# Patient Record
Sex: FEMALE | Race: WHITE
[De-identification: names, ages, dates, MRNs, and addresses within clinical notes are randomized per-mention and may not be internally consistent; named-entity substitution may affect disease eponyms.]

---

## 2017-03-21 ENCOUNTER — HOSPITAL ENCOUNTER (INPATIENT)
Dept: HOSPITAL 26 - MED | Age: 42
LOS: 3 days | Discharge: HOME | DRG: 463 | End: 2017-03-24
Attending: FAMILY MEDICINE | Admitting: FAMILY MEDICINE
Payer: COMMERCIAL

## 2017-03-21 VITALS — HEIGHT: 61 IN | BODY MASS INDEX: 27.38 KG/M2 | WEIGHT: 145 LBS

## 2017-03-21 VITALS — SYSTOLIC BLOOD PRESSURE: 146 MMHG | DIASTOLIC BLOOD PRESSURE: 95 MMHG

## 2017-03-21 DIAGNOSIS — E83.42: ICD-10-CM

## 2017-03-21 DIAGNOSIS — E83.51: ICD-10-CM

## 2017-03-21 DIAGNOSIS — Z90.49: ICD-10-CM

## 2017-03-21 DIAGNOSIS — J45.909: ICD-10-CM

## 2017-03-21 DIAGNOSIS — N12: Primary | ICD-10-CM

## 2017-03-21 DIAGNOSIS — N39.0: ICD-10-CM

## 2017-03-21 DIAGNOSIS — I10: ICD-10-CM

## 2017-03-21 DIAGNOSIS — Z72.89: ICD-10-CM

## 2017-03-21 DIAGNOSIS — Z88.1: ICD-10-CM

## 2017-03-21 DIAGNOSIS — I42.9: ICD-10-CM

## 2017-03-21 DIAGNOSIS — N23: ICD-10-CM

## 2017-03-21 LAB
ALBUMIN FLD-MCNC: 3.5 G/DL (ref 3.4–5)
ALP SERPL-CCNC: 88 U/L (ref 46–116)
ALT SERPL-CCNC: 61 U/L (ref 12–78)
ANION GAP SERPL CALCULATED.3IONS-SCNC: 14.1 MMOL/L (ref 8–16)
APPEARANCE UR: (no result)
AST SERPL-CCNC: 35 U/L (ref 15–37)
BACTERIA URNS QL MICRO: (no result) /HPF
BILIRUB SERPL-MCNC: 0.5 MG/DL (ref 0–1)
BILIRUB UR QL STRIP: NEGATIVE
BUN SERPL-MCNC: 10 MG/DL (ref 7–18)
CALCIUM SPEC-MCNC: 8 MG/DL (ref 8.5–10.1)
CHLORIDE SERPL-SCNC: 104 MMOL/L (ref 98–107)
CO2 SERPL-SCNC: 24.8 MMOL/L (ref 21–32)
COLOR UR: YELLOW
CREAT SERPL-MCNC: 0.7 MG/DL (ref 0.6–1.3)
ERYTHROCYTE [DISTWIDTH] IN BLOOD BY AUTOMATED COUNT: 12.7 % (ref 11.6–13.7)
GFR SERPL CREATININE-BSD FRML MDRD: 117 ML/MIN (ref 90–?)
GLUCOSE SERPL-MCNC: 96 MG/DL (ref 74–106)
GLUCOSE UR STRIP-MCNC: NEGATIVE MG/DL
HCT VFR BLD AUTO: 38.3 % (ref 36–48)
HGB BLD-MCNC: 13.1 G/DL (ref 12–16)
HGB UR QL STRIP: (no result)
LACTATE PLASV-SCNC: 0.7 MMOL/L (ref 0.4–2)
LEUKOCYTE ESTERASE UR QL STRIP: NEGATIVE
LIPASE SERPL-CCNC: 163 U/L (ref 73–393)
LYMPHOCYTES NFR BLD MANUAL: 5 % (ref 20–46)
MCH RBC QN AUTO: 30 PG (ref 27–31)
MCHC RBC AUTO-ENTMCNC: 34 G/DL (ref 33–37)
MCV RBC AUTO: 87 FL (ref 80–94)
MONOCYTES NFR BLD MANUAL: 3 % (ref 5–12)
MUCOUS THREADS URNS QL MICRO: (no result) /LPF
NEUTROPHILS % (MANUAL): 81 (ref 43–65)
NEUTS BAND NFR BLD MANUAL: 11 % (ref 0–8)
NITRITE UR QL STRIP: NEGATIVE
PH UR STRIP: 6 [PH] (ref 5–9)
PLATELET # BLD AUTO: 367 K/UL (ref 140–450)
PLATELET BLD QL SMEAR: ADEQUATE
POTASSIUM SERPL-SCNC: 3.9 MMOL/L (ref 3.5–5.1)
PROT SERPL-MCNC: 7.8 G/DL (ref 6.4–8.2)
PROT UR QL STRIP: NEGATIVE
RBC # BLD AUTO: 4.4 MIL/UL (ref 4.2–5.4)
RBC #/AREA URNS HPF: (no result) /HPF (ref 0–5)
SODIUM SERPL-SCNC: 139 MMOL/L (ref 136–145)
SP GR UR STRIP: 1.02 (ref 1–1.03)
SQUAMOUS URNS QL MICRO: (no result) /LPF
UROBILINOGEN UR STRIP-MCNC: 0.2 EU/DL (ref 0.2–1)
WBC # BLD AUTO: 24.3 K/UL (ref 4.8–10.8)
WBC,URINE: (no result) /HPF (ref 0–5)

## 2017-03-21 PROCEDURE — G0482 DRUG TEST DEF 15-21 CLASSES: HCPCS

## 2017-03-21 NOTE — NUR
41/F presents to ED for evaluation of right sided abdominal pain radiating to 
right flank area for the past 2 weeks. Patient states she was sent home from 
work today. Pt states she has had a fever on and off. Afebrile at this time. Pt 
states the pain started suddenly and was intermittent and has been constant 
this last week. Patient describes pain as sharp, radiating to right flank, 
9/10. Patient denies N/V/D at this time. Denies any painful urination or 
dysuria. Pt states she has hx of UTI's with no symptoms. Patient is AOX4, 
ambulatory with steady gait. Skin warm and dry. VSS.

## 2017-03-22 VITALS — SYSTOLIC BLOOD PRESSURE: 140 MMHG | DIASTOLIC BLOOD PRESSURE: 75 MMHG

## 2017-03-22 VITALS — DIASTOLIC BLOOD PRESSURE: 86 MMHG | SYSTOLIC BLOOD PRESSURE: 154 MMHG

## 2017-03-22 VITALS — SYSTOLIC BLOOD PRESSURE: 126 MMHG | DIASTOLIC BLOOD PRESSURE: 65 MMHG

## 2017-03-22 LAB
AMPHET UR-MCNC: NEGATIVE NG/ML
AMYLASE SERPL-CCNC: 97 U/L (ref 25–115)
ANION GAP SERPL CALCULATED.3IONS-SCNC: 11.5 MMOL/L (ref 8–16)
APTT PPP: 28 SECS (ref 22–35.6)
BARBITURATES UR QL SCN: NEGATIVE NG/ML
BENZODIAZ UR QL SCN: NEGATIVE NG/ML
BUN SERPL-MCNC: 6 MG/DL (ref 7–18)
BZE UR QL SCN: NEGATIVE NG/ML
CALCIUM SPEC-MCNC: 6.9 MG/DL (ref 8.5–10.1)
CANNABINOIDS UR QL SCN: NEGATIVE NG/ML
CHLORIDE SERPL-SCNC: 106 MMOL/L (ref 98–107)
CHOLEST SERPL-MCNC: 146 MG/DL (ref ?–200)
CHOLEST/HDLC SERPL: 3.2 {RATIO} (ref 1–4.5)
CO2 SERPL-SCNC: 26.1 MMOL/L (ref 21–32)
CREAT SERPL-MCNC: 0.6 MG/DL (ref 0.6–1.3)
ERYTHROCYTE [DISTWIDTH] IN BLOOD BY AUTOMATED COUNT: 12.4 % (ref 11.6–13.7)
ETHANOL SERPL-MCNC: < 3 MG/DL (ref ?–3)
GFR SERPL CREATININE-BSD FRML MDRD: 140 ML/MIN (ref 90–?)
GLUCOSE SERPL-MCNC: 99 MG/DL (ref 74–106)
HCT VFR BLD AUTO: 36.6 % (ref 36–48)
HDLC SERPL-MCNC: 46 MG/DL (ref 40–60)
HGB BLD-MCNC: 12.3 G/DL (ref 12–16)
INR PPP: 1.1 (ref 0.8–1.2)
LACTATE PLASV-SCNC: 0.6 MMOL/L (ref 0.4–2)
LDLC SERPL CALC-MCNC: 91 MG/DL (ref 60–100)
LIPASE SERPL-CCNC: 377 U/L (ref 73–393)
LYMPHOCYTES NFR BLD MANUAL: 11 % (ref 20–46)
MAGNESIUM SERPL-MCNC: 1.6 MG/DL (ref 1.8–2.4)
MAGNESIUM SERPL-MCNC: 1.7 MG/DL (ref 1.8–2.4)
MCH RBC QN AUTO: 29 PG (ref 27–31)
MCHC RBC AUTO-ENTMCNC: 34 G/DL (ref 33–37)
MCV RBC AUTO: 87 FL (ref 80–94)
MONOCYTES NFR BLD MANUAL: 4 % (ref 5–12)
NEUTROPHILS % (MANUAL): 79 (ref 43–65)
NEUTS BAND NFR BLD MANUAL: 6 % (ref 0–8)
OPIATES UR QL SCN: NEGATIVE NG/ML
PCP UR QL SCN: NEGATIVE NG/ML
PHOSPHATE SERPL-MCNC: 3 MG/DL (ref 2.5–4.9)
PHOSPHATE SERPL-MCNC: 3.1 MG/DL (ref 2.5–4.9)
PLATELET # BLD AUTO: 353 K/UL (ref 140–450)
POTASSIUM SERPL-SCNC: 3.6 MMOL/L (ref 3.5–5.1)
PROTHROMBIN TIME: 10.5 SECS (ref 10.8–13.4)
RBC # BLD AUTO: 4.18 MIL/UL (ref 4.2–5.4)
SODIUM SERPL-SCNC: 140 MMOL/L (ref 136–145)
T4 FREE SERPL-MCNC: 1.18 NG/DL (ref 0.76–1.46)
TRIGL SERPL-MCNC: 48 MG/DL (ref 30–150)
TSH SERPL DL<=0.05 MIU/L-ACNC: 0.55 UIU/ML (ref 0.34–3.76)
WBC # BLD AUTO: 21 K/UL (ref 4.8–10.8)

## 2017-03-22 RX ADMIN — DOCUSATE SODIUM SCH MG: 100 CAPSULE, LIQUID FILLED ORAL at 21:08

## 2017-03-22 RX ADMIN — SODIUM CHLORIDE SCH MLS/HR: 9 INJECTION, SOLUTION INTRAVENOUS at 02:55

## 2017-03-22 RX ADMIN — SODIUM CHLORIDE SCH MLS/HR: 9 INJECTION, SOLUTION INTRAVENOUS at 21:52

## 2017-03-22 RX ADMIN — HYDROCODONE BITARTRATE AND ACETAMINOPHEN PRN TAB: 5; 325 TABLET ORAL at 21:08

## 2017-03-22 RX ADMIN — CALCIUM CARBONATE-VITAMIN D TAB 500 MG-200 UNIT SCH TAB: 500-200 TAB at 21:07

## 2017-03-22 RX ADMIN — ACETAMINOPHEN PRN MG: 325 TABLET ORAL at 06:23

## 2017-03-22 RX ADMIN — SODIUM CHLORIDE SCH MLS/HR: 9 INJECTION, SOLUTION INTRAVENOUS at 13:14

## 2017-03-22 NOTE — NUR
PT ARRIVED ON UNIT IN STABLE CONDITION. NO SOB, NO SIGNS OF DISTRESS. VS STABLE ON ROOM AIR. 
PT IS AOX4, AMBULATORY. SKIN IS INTACT. IV TO RT AC 20G PATENT ASYMPTOMATIC, INTACT, SALINE 
LOCKED. PT DENIES PAIN AT THIS TIME BUT C/O NAUSEA. PT STATED THAT HER LAST PAIN MEDICATION 
MADE HER NAUSEOUS AND THAT ZOFRAN DID NOT HELP. ORIENTED PT TO ROOM AND UNIT. PLAN OF CARE 
DISCUSSED WITH PT. SAFETY MEASURES IN PLACE. CALL LIGHT WITHIN REACH. WILL CONTINUE TO 
MONITOR.

## 2017-03-22 NOTE — NUR
Pt report given to TIANA NDIAYE. Transfer of care at this time. Patient will be 
admitted to care of DR CARBALLO. Admited to TELE 120A.  Will go to room 120A. 
Belongings list completed.  Report to TIANA NDIAYE.

## 2017-03-22 NOTE — NUR
2ND LITER OF BOLUS ALMOST FINISHED, PT TOLERATING WELL. NO SOB, NO SIGNS OF DISTRESS. IV 
SITE ASYMPTOMATIC, INTACT, PATENT, IVF RUNNING. PT DENIES PAIN AT THIS TIME. PLAN OF CARE 
DISCUSSED WITH PT. SAFETY MEASURES IN PLACE. CALL LIGHT WITHIN REACH. WILL CONTINUE TO 
MONITOR.

## 2017-03-22 NOTE — NUR
RECEIVED SBAR REPORT FROM SENTHIL PIMENTEL AT PT BEDSIDE. PT RESTING IN BED. DENIES DISCOMFORT AT 
THIS TIME. NO S/S OF RESPIRATORY DISTRESS. IV SITE PATENT AND INTACT. CALL LIGHT WITHIN 
REACH.

## 2017-03-22 NOTE — NUR
PT ASLEEP IN BED. NO SOB, NO SIGNS OF DISTRESS. IV SITE ASYMPTOMATIC, INTACT, PATENT, IVF 
RUNNING. SAFETY MEASURES IN PLACE. CALL LIGHT WITHIN REACH. WILL CONTINUE TO MONITOR.

## 2017-03-22 NOTE — NUR
CM NOTE

INITIAL REVIEW SENT TO [facility] FAX# [phone] PH# [phone] ATTN: [facility] WADDELL EXT 
[id number]

## 2017-03-22 NOTE — NUR
RECD. RESTING IN BED, AWAKE, A/OX4. RESPIRATION EVEN AND UNLABORED. IV OF NS AT 80ML/HR 
INFUSING, RIGHT AC G20. AMBULATORY TO BR. PLAN OF CARE FOR THE SHIFT DISCUSSED, VERBALIZED 
UNDERSTANDING. ENCOURAGED TO INCREASED WATER INTAKE. CRANBERRY JUICES GIVEN. DENIES PAIN 
0/10.

## 2017-03-22 NOTE — NUR
RECEIVED PT REPORT FROM CHEIKH IN ER. INFORMED PT HAS NO MEDICATION RECONCILIATION AND NO CODE 
STATUS.

## 2017-03-22 NOTE — NUR
PATIENT HAS BEEN SCREENED AND CATEGORIZED AS MODERATE NUTRITION RISK. PATIENT WILL BE SEEN 
WITHIN 3-5 DAYS OF ADMISSION.



3/24/17-3/26/17



BONITA CHACON RD

## 2017-03-23 VITALS — DIASTOLIC BLOOD PRESSURE: 80 MMHG | SYSTOLIC BLOOD PRESSURE: 127 MMHG

## 2017-03-23 VITALS — SYSTOLIC BLOOD PRESSURE: 129 MMHG | DIASTOLIC BLOOD PRESSURE: 87 MMHG

## 2017-03-23 VITALS — SYSTOLIC BLOOD PRESSURE: 108 MMHG | DIASTOLIC BLOOD PRESSURE: 58 MMHG

## 2017-03-23 LAB
ANION GAP SERPL CALCULATED.3IONS-SCNC: 10.6 MMOL/L (ref 8–16)
BASOPHILS # BLD AUTO: 0.1 K/UL (ref 0–0.22)
BASOPHILS NFR BLD AUTO: 1.3 % (ref 0–2)
BUN SERPL-MCNC: 9 MG/DL (ref 7–18)
CALCIUM SPEC-MCNC: 7.7 MG/DL (ref 8.5–10.1)
CHLORIDE SERPL-SCNC: 106 MMOL/L (ref 98–107)
CO2 SERPL-SCNC: 26.3 MMOL/L (ref 21–32)
CREAT SERPL-MCNC: 0.6 MG/DL (ref 0.6–1.3)
EOSINOPHIL # BLD AUTO: 0.2 K/UL (ref 0–0.4)
EOSINOPHIL NFR BLD AUTO: 1.6 % (ref 0–4)
ERYTHROCYTE [DISTWIDTH] IN BLOOD BY AUTOMATED COUNT: 12.4 % (ref 11.6–13.7)
GFR SERPL CREATININE-BSD FRML MDRD: 140 ML/MIN (ref 90–?)
GLUCOSE SERPL-MCNC: 88 MG/DL (ref 74–106)
HCT VFR BLD AUTO: 35.6 % (ref 36–48)
HGB BLD-MCNC: 11.9 G/DL (ref 12–16)
LYMPHOCYTES # BLD AUTO: 3.6 K/UL (ref 2.5–16.5)
LYMPHOCYTES NFR BLD AUTO: 35.6 % (ref 20.5–51.1)
MAGNESIUM SERPL-MCNC: 1.9 MG/DL (ref 1.8–2.4)
MCH RBC QN AUTO: 29 PG (ref 27–31)
MCHC RBC AUTO-ENTMCNC: 33 G/DL (ref 33–37)
MCV RBC AUTO: 88 FL (ref 80–94)
MONOCYTES # BLD AUTO: 0.7 K/UL (ref 0.8–1)
MONOCYTES NFR BLD AUTO: 6.6 % (ref 1.7–9.3)
NEUTROPHILS # BLD AUTO: 5.6 K/UL (ref 1.8–7.7)
NEUTROPHILS NFR BLD AUTO: 54.9 % (ref 42.2–75.2)
PHOSPHATE SERPL-MCNC: 3.4 MG/DL (ref 2.5–4.9)
PLATELET # BLD AUTO: 349 K/UL (ref 140–450)
POTASSIUM SERPL-SCNC: 3.9 MMOL/L (ref 3.5–5.1)
RBC # BLD AUTO: 4.05 MIL/UL (ref 4.2–5.4)
SODIUM SERPL-SCNC: 139 MMOL/L (ref 136–145)
T3RU NFR SERPL: 28 % (ref 24–39)
WBC # BLD AUTO: 10.2 K/UL (ref 4.8–10.8)

## 2017-03-23 RX ADMIN — SODIUM CHLORIDE SCH MLS/HR: 9 INJECTION, SOLUTION INTRAVENOUS at 01:44

## 2017-03-23 RX ADMIN — ACETAMINOPHEN PRN MG: 325 TABLET ORAL at 19:24

## 2017-03-23 RX ADMIN — CALCIUM CARBONATE-VITAMIN D TAB 500 MG-200 UNIT SCH TAB: 500-200 TAB at 21:21

## 2017-03-23 RX ADMIN — SODIUM CHLORIDE SCH MLS/HR: 9 INJECTION, SOLUTION INTRAVENOUS at 23:48

## 2017-03-23 RX ADMIN — DOCUSATE SODIUM SCH MG: 100 CAPSULE, LIQUID FILLED ORAL at 21:21

## 2017-03-23 RX ADMIN — DOCUSATE SODIUM SCH MG: 100 CAPSULE, LIQUID FILLED ORAL at 09:15

## 2017-03-23 RX ADMIN — HYDROCODONE BITARTRATE AND ACETAMINOPHEN PRN TAB: 5; 325 TABLET ORAL at 23:44

## 2017-03-23 RX ADMIN — SODIUM CHLORIDE SCH MLS/HR: 9 INJECTION, SOLUTION INTRAVENOUS at 14:23

## 2017-03-23 RX ADMIN — Medication SCH TAB: at 09:15

## 2017-03-23 RX ADMIN — CALCIUM CARBONATE-VITAMIN D TAB 500 MG-200 UNIT SCH TAB: 500-200 TAB at 09:15

## 2017-03-23 NOTE — NUR
CM NOTE

CONCURRENT REVIEW SENT TO [facility] FAX# [phone] PH# [phone] Z[facility]IA WADDELL EXT 
[id number]

## 2017-03-23 NOTE — NUR
RECD. RESTING IN BED, AWAKE, A/OX4. RESPIRATION EVEN AND UNLABORED. IV OF NS AT 90 ML/HR 
INFUSING, RIGHT AC G20. STATED STILL WITH ON AND OFF LOW BACK PAIN. ENCOURAGED TO CONTINUE 
DRINKING MORE WATER AND CRANBERRY JUICES. PLAN OF CARE FOR THE SHIFT DISCUSSED. VERBALIZED 
UNDERSTANDING. DENIES PAIN AT THIS TIME 0/10. FAMILY AT THE BEDSIDE.

-------------------------------------------------------------------------------

Addendum: 03/23/17 at 2207 by Kezia Guerrero LVN

-------------------------------------------------------------------------------

CORRECTION : IV OF NS AT 80 ML/HR INFUSING.

## 2017-03-23 NOTE — NUR
PT AWAKE SITTING ON BED WATCHING TV. DINNER SERVED, PT HAS GOOD APPETITE. ALL NEEDS MET, 
WILL CONTINUE TO MONITOR.

## 2017-03-23 NOTE — NUR
FREQUENT VISUAL CHECKS, PT HAS NO COMPLAINTS AT THIS TIME. ALL NEEDS MET. CALL LIGHT WITHIN 
REACH, WILL CONTINUE TO MONITOR.

## 2017-03-23 NOTE — NUR
PT AWAKE TALKING TO RELATIVE AT BEDSIDE. ALL NEEDS MET AT THIS TIME. CALL LIGHT WITHIN 
REACH, WILL CONTINUE TO MONITOR.

## 2017-03-23 NOTE — NUR
RECEIVED PT ASLEEP BUT EASILY AROUSABLE. AAOX4, WITH IV ACCESS AT RIGHT AC 20G INFUSING 
FLUIDS WELL. NO S/S OF RESPIRATORY DISTRESS OR DISCOMFORT. DISCUSSED PLAN OF CARE, PT 
VERBALIZED UNDERSTANDING. CALL LIGHT WITHIN REACH, WILL CONTINUE TO MONITOR.

## 2017-03-24 VITALS — SYSTOLIC BLOOD PRESSURE: 140 MMHG | DIASTOLIC BLOOD PRESSURE: 90 MMHG

## 2017-03-24 VITALS — DIASTOLIC BLOOD PRESSURE: 71 MMHG | SYSTOLIC BLOOD PRESSURE: 146 MMHG

## 2017-03-24 LAB
ANION GAP SERPL CALCULATED.3IONS-SCNC: 8 MMOL/L (ref 8–16)
BASOPHILS # BLD AUTO: 0.2 K/UL (ref 0–0.22)
BASOPHILS NFR BLD AUTO: 2.2 % (ref 0–2)
BUN SERPL-MCNC: 10 MG/DL (ref 7–18)
CALCIUM SPEC-MCNC: 8.3 MG/DL (ref 8.5–10.1)
CHLORIDE SERPL-SCNC: 104 MMOL/L (ref 98–107)
CO2 SERPL-SCNC: 29.8 MMOL/L (ref 21–32)
CREAT SERPL-MCNC: 0.7 MG/DL (ref 0.6–1.3)
EOSINOPHIL # BLD AUTO: 0.2 K/UL (ref 0–0.4)
EOSINOPHIL NFR BLD AUTO: 2 % (ref 0–4)
ERYTHROCYTE [DISTWIDTH] IN BLOOD BY AUTOMATED COUNT: 12.2 % (ref 11.6–13.7)
GFR SERPL CREATININE-BSD FRML MDRD: 117 ML/MIN (ref 90–?)
GLUCOSE SERPL-MCNC: 86 MG/DL (ref 74–106)
HCT VFR BLD AUTO: 37.9 % (ref 36–48)
HGB BLD-MCNC: 12.8 G/DL (ref 12–16)
LYMPHOCYTES # BLD AUTO: 3.8 K/UL (ref 2.5–16.5)
LYMPHOCYTES NFR BLD AUTO: 38.3 % (ref 20.5–51.1)
MAGNESIUM SERPL-MCNC: 1.7 MG/DL (ref 1.8–2.4)
MCH RBC QN AUTO: 30 PG (ref 27–31)
MCHC RBC AUTO-ENTMCNC: 34 G/DL (ref 33–37)
MCV RBC AUTO: 88 FL (ref 80–94)
MONOCYTES # BLD AUTO: 0.7 K/UL (ref 0.8–1)
MONOCYTES NFR BLD AUTO: 7.2 % (ref 1.7–9.3)
NEUTROPHILS # BLD AUTO: 4.9 K/UL (ref 1.8–7.7)
NEUTROPHILS NFR BLD AUTO: 50.3 % (ref 42.2–75.2)
PHOSPHATE SERPL-MCNC: 5 MG/DL (ref 2.5–4.9)
PLATELET # BLD AUTO: 398 K/UL (ref 140–450)
POTASSIUM SERPL-SCNC: 3.8 MMOL/L (ref 3.5–5.1)
RBC # BLD AUTO: 4.29 MIL/UL (ref 4.2–5.4)
SODIUM SERPL-SCNC: 138 MMOL/L (ref 136–145)
WBC # BLD AUTO: 9.8 K/UL (ref 4.8–10.8)

## 2017-03-24 RX ADMIN — CALCIUM CARBONATE-VITAMIN D TAB 500 MG-200 UNIT SCH TAB: 500-200 TAB at 09:17

## 2017-03-24 RX ADMIN — Medication SCH TAB: at 09:17

## 2017-03-24 RX ADMIN — DOCUSATE SODIUM SCH MG: 100 CAPSULE, LIQUID FILLED ORAL at 09:17

## 2017-03-24 RX ADMIN — SODIUM CHLORIDE SCH MLS/HR: 9 INJECTION, SOLUTION INTRAVENOUS at 02:44

## 2017-03-24 NOTE — NUR
CM NOTE

CONCURRENT REVIEW SENT TO [facility] FAX# [phone] PH# [phone] ZENIA WADDELL EXT 
[id number]

## 2017-03-24 NOTE — NUR
PT CLEARED FOR DISCHARGE. DISCHARGE INSTRUCTIONS PROVIDED. PT VERBALIZED UNDERSTANDING. IV 
TAKEN OUT TIP INTACT. NO S/S OF ACUTE DISTRESS. PT DENIES PAIN. AWAITING RIDE FROM FAMILY 
MEMBER.

## 2017-03-24 NOTE — NUR
PT TOLERATED AM MEDS WELL. NO S/S OF ACUTE DISTRESS. PT DENIES PAIN. CALL LIGHT WITHIN 
REACH. SAFETY MEASURES ENSURED. WILL CONTINUE TO MONITOR.

## 2017-05-27 ENCOUNTER — HOSPITAL ENCOUNTER (EMERGENCY)
Dept: HOSPITAL 1 - ED | Age: 42
Discharge: HOME | End: 2017-05-27
Payer: MEDICAID

## 2017-05-27 VITALS — SYSTOLIC BLOOD PRESSURE: 129 MMHG | DIASTOLIC BLOOD PRESSURE: 81 MMHG

## 2017-05-27 DIAGNOSIS — Z90.49: ICD-10-CM

## 2017-05-27 DIAGNOSIS — Z88.1: ICD-10-CM

## 2017-05-27 DIAGNOSIS — J45.909: ICD-10-CM

## 2017-05-27 DIAGNOSIS — J02.9: ICD-10-CM

## 2017-05-27 DIAGNOSIS — N12: Primary | ICD-10-CM

## 2017-05-27 LAB
ALBUMIN SERPL-MCNC: 3.3 G/DL (ref 3.4–5)
ALP SERPL-CCNC: 75 U/L (ref 46–116)
ALT SERPL-CCNC: 59 U/L (ref 14–59)
AST SERPL-CCNC: 36 U/L (ref 15–37)
BASOPHILS NFR BLD: 0 % (ref 0–2)
BILIRUB SERPL-MCNC: 0.74 MG/DL (ref 0.2–1)
BUN SERPL-MCNC: 7 MG/DL (ref 7–18)
CALCIUM SERPL-MCNC: 8.6 MG/DL (ref 8.5–10.1)
CHLORIDE SERPL-SCNC: 101 MMOL/L (ref 98–107)
CO2 SERPL-SCNC: 26.2 MMOL/L (ref 21–32)
CREAT SERPL-MCNC: 0.7 MG/DL (ref 0.6–1)
ERYTHROCYTE [DISTWIDTH] IN BLOOD BY AUTOMATED COUNT: 13.7 % (ref 11.5–14.5)
GFR SERPLBLD BASED ON 1.73 SQ M-ARVRAT: > 60 ML/MIN
GLUCOSE SERPL-MCNC: 105 MG/DL (ref 74–106)
LIPASE SERPL-CCNC: 120 IU/L (ref 73–393)
MICROSCOPIC UR-IMP: YES
MONOCYTES NFR BLD: 5 % (ref 0–7)
NEUTS BAND NFR BLD: 6 % (ref 0–10)
NEUTS SEG NFR BLD MANUAL: 81 % (ref 37–75)
PLAT MORPH BLD: (no result)
PLATELET # BLD: 360 X10^3MCL (ref 130–400)
POTASSIUM SERPL-SCNC: 4 MMOL/L (ref 3.5–5.1)
PROT SERPL-MCNC: 7.8 G/DL (ref 6.4–8.2)
RBC # UR STRIP.AUTO: (no result) /UL
SODIUM SERPL-SCNC: 136 MMOL/L (ref 136–145)
UA SPECIFIC GRAVITY: 1.01 (ref 1–1.03)

## 2017-06-28 ENCOUNTER — HOSPITAL ENCOUNTER (EMERGENCY)
Dept: HOSPITAL 1 - ED | Age: 42
LOS: 1 days | Discharge: HOME | End: 2017-06-29
Payer: COMMERCIAL

## 2017-06-28 DIAGNOSIS — R39.15: ICD-10-CM

## 2017-06-28 DIAGNOSIS — M62.830: Primary | ICD-10-CM

## 2017-06-28 DIAGNOSIS — J45.909: ICD-10-CM

## 2017-06-28 DIAGNOSIS — R03.0: ICD-10-CM

## 2017-06-28 DIAGNOSIS — R35.0: ICD-10-CM

## 2017-06-29 VITALS — SYSTOLIC BLOOD PRESSURE: 139 MMHG | DIASTOLIC BLOOD PRESSURE: 80 MMHG

## 2017-06-29 LAB
ALBUMIN SERPL-MCNC: 3.4 G/DL (ref 3.4–5)
ALP SERPL-CCNC: 70 U/L (ref 46–116)
ALT SERPL-CCNC: 31 U/L (ref 14–59)
AST SERPL-CCNC: 21 U/L (ref 15–37)
BASOPHILS NFR BLD: 0.6 % (ref 0–2)
BILIRUB SERPL-MCNC: 0.31 MG/DL (ref 0.2–1)
BUN SERPL-MCNC: 16 MG/DL (ref 7–18)
CALCIUM SERPL-MCNC: 8.3 MG/DL (ref 8.5–10.1)
CHLORIDE SERPL-SCNC: 103 MMOL/L (ref 98–107)
CO2 SERPL-SCNC: 24.6 MMOL/L (ref 21–32)
CREAT SERPL-MCNC: 0.7 MG/DL (ref 0.6–1)
ERYTHROCYTE [DISTWIDTH] IN BLOOD BY AUTOMATED COUNT: 13.9 % (ref 11.5–14.5)
GFR SERPLBLD BASED ON 1.73 SQ M-ARVRAT: > 60 ML/MIN
GLUCOSE SERPL-MCNC: 92 MG/DL (ref 74–106)
MICROSCOPIC UR-IMP: YES
PLATELET # BLD: 364 X10^3MCL (ref 130–400)
POTASSIUM SERPL-SCNC: 3.9 MMOL/L (ref 3.5–5.1)
PROT SERPL-MCNC: 7.8 G/DL (ref 6.4–8.2)
RBC # UR STRIP.AUTO: (no result) /UL
SODIUM SERPL-SCNC: 138 MMOL/L (ref 136–145)
UA SPECIFIC GRAVITY: 1.01 (ref 1–1.03)

## 2018-01-04 ENCOUNTER — HOSPITAL ENCOUNTER (EMERGENCY)
Dept: HOSPITAL 1 - ED | Age: 43
Discharge: HOME | End: 2018-01-04
Payer: COMMERCIAL

## 2018-01-04 VITALS — SYSTOLIC BLOOD PRESSURE: 135 MMHG | DIASTOLIC BLOOD PRESSURE: 68 MMHG

## 2018-01-04 DIAGNOSIS — I10: ICD-10-CM

## 2018-01-04 DIAGNOSIS — J45.909: ICD-10-CM

## 2018-01-04 DIAGNOSIS — J03.90: Primary | ICD-10-CM

## 2018-01-04 DIAGNOSIS — Z88.0: ICD-10-CM

## 2018-06-20 ENCOUNTER — HOSPITAL ENCOUNTER (EMERGENCY)
Dept: HOSPITAL 1 - ED | Age: 43
Discharge: HOME | End: 2018-06-20
Payer: COMMERCIAL

## 2018-06-20 VITALS
HEIGHT: 62.99 IN | BODY MASS INDEX: 26.57 KG/M2 | WEIGHT: 149.98 LBS | BODY MASS INDEX: 26.57 KG/M2 | HEIGHT: 62.99 IN | WEIGHT: 149.98 LBS

## 2018-06-20 VITALS — SYSTOLIC BLOOD PRESSURE: 142 MMHG | DIASTOLIC BLOOD PRESSURE: 99 MMHG

## 2018-06-20 DIAGNOSIS — J45.909: ICD-10-CM

## 2018-06-20 DIAGNOSIS — J06.9: Primary | ICD-10-CM

## 2018-06-20 DIAGNOSIS — Z88.1: ICD-10-CM

## 2018-06-20 DIAGNOSIS — I10: ICD-10-CM

## 2018-06-22 ENCOUNTER — HOSPITAL ENCOUNTER (EMERGENCY)
Dept: HOSPITAL 1 - ED | Age: 43
Discharge: HOME | End: 2018-06-22
Payer: COMMERCIAL

## 2018-06-22 VITALS — DIASTOLIC BLOOD PRESSURE: 80 MMHG | SYSTOLIC BLOOD PRESSURE: 137 MMHG

## 2018-06-22 VITALS
HEIGHT: 60.98 IN | WEIGHT: 149.98 LBS | BODY MASS INDEX: 28.32 KG/M2 | WEIGHT: 149.98 LBS | BODY MASS INDEX: 28.32 KG/M2 | HEIGHT: 60.98 IN

## 2018-06-22 DIAGNOSIS — I10: ICD-10-CM

## 2018-06-22 DIAGNOSIS — J45.909: ICD-10-CM

## 2018-06-22 DIAGNOSIS — Z88.0: ICD-10-CM

## 2018-06-22 DIAGNOSIS — R05: Primary | ICD-10-CM

## 2018-06-27 ENCOUNTER — HOSPITAL ENCOUNTER (EMERGENCY)
Dept: HOSPITAL 1 - ED | Age: 43
Discharge: HOME | End: 2018-06-27
Payer: COMMERCIAL

## 2018-06-27 VITALS
BODY MASS INDEX: 28.32 KG/M2 | BODY MASS INDEX: 28.32 KG/M2 | HEIGHT: 60.98 IN | WEIGHT: 149.98 LBS | WEIGHT: 149.98 LBS | HEIGHT: 60.98 IN

## 2018-06-27 VITALS — SYSTOLIC BLOOD PRESSURE: 151 MMHG | DIASTOLIC BLOOD PRESSURE: 96 MMHG

## 2018-06-27 DIAGNOSIS — S80.02XA: ICD-10-CM

## 2018-06-27 DIAGNOSIS — S29.012A: Primary | ICD-10-CM

## 2018-06-27 DIAGNOSIS — J45.909: ICD-10-CM
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
## 2018-06-27 DIAGNOSIS — Z88.0: ICD-10-CM
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
## 2018-06-27 DIAGNOSIS — I10: ICD-10-CM

## 2018-06-27 DIAGNOSIS — S20.219A: ICD-10-CM

## 2018-12-20 NOTE — NUR
Bedside shift change report given to Marlen Grove (oncoming nurse) by Kirstie Velasco RN (offgoing nurse). Report included the following information SBAR, Kardex, Intake/Output, Recent Results and Cardiac Rhythm NSR. ENDORSED PT IN STABLE CONDITION TO SENTHIL DOLAN. ALL NEEDS HAVE BEEN MET AT THIS TIME.

## 2019-06-15 ENCOUNTER — HOSPITAL ENCOUNTER (EMERGENCY)
Dept: HOSPITAL 1 - ED | Age: 44
Discharge: HOME | End: 2019-06-15
Payer: MEDICAID

## 2019-06-15 VITALS
HEIGHT: 61 IN | BODY MASS INDEX: 28.35 KG/M2 | BODY MASS INDEX: 28.35 KG/M2 | HEIGHT: 61 IN | WEIGHT: 150.13 LBS | WEIGHT: 150.13 LBS

## 2019-06-15 VITALS — SYSTOLIC BLOOD PRESSURE: 125 MMHG | DIASTOLIC BLOOD PRESSURE: 88 MMHG

## 2019-06-15 DIAGNOSIS — Z88.1: ICD-10-CM

## 2019-06-15 DIAGNOSIS — I10: ICD-10-CM

## 2019-06-15 DIAGNOSIS — G89.18: Primary | ICD-10-CM

## 2019-06-15 DIAGNOSIS — J45.909: ICD-10-CM

## 2019-06-16 ENCOUNTER — HOSPITAL ENCOUNTER (EMERGENCY)
Dept: HOSPITAL 1 - ED | Age: 44
Discharge: HOME | End: 2019-06-16
Payer: MEDICAID

## 2019-06-16 VITALS — DIASTOLIC BLOOD PRESSURE: 70 MMHG | SYSTOLIC BLOOD PRESSURE: 160 MMHG

## 2019-06-16 VITALS
HEIGHT: 61 IN | WEIGHT: 148 LBS | HEIGHT: 61 IN | BODY MASS INDEX: 27.94 KG/M2 | BODY MASS INDEX: 27.94 KG/M2 | WEIGHT: 148 LBS

## 2019-06-16 DIAGNOSIS — Z90.89: ICD-10-CM

## 2019-06-16 DIAGNOSIS — Z48.01: Primary | ICD-10-CM

## 2019-06-16 DIAGNOSIS — I10: ICD-10-CM

## 2019-06-16 DIAGNOSIS — J45.909: ICD-10-CM

## 2019-07-02 ENCOUNTER — HOSPITAL ENCOUNTER (EMERGENCY)
Dept: HOSPITAL 1 - ED | Age: 44
Discharge: HOME | End: 2019-07-02
Payer: MEDICAID

## 2019-07-02 VITALS — SYSTOLIC BLOOD PRESSURE: 106 MMHG | DIASTOLIC BLOOD PRESSURE: 57 MMHG

## 2019-07-02 VITALS — BODY MASS INDEX: 28.58 KG/M2 | WEIGHT: 151.37 LBS | HEIGHT: 61 IN

## 2019-07-02 DIAGNOSIS — Z88.0: ICD-10-CM

## 2019-07-02 DIAGNOSIS — J45.909: ICD-10-CM

## 2019-07-02 DIAGNOSIS — I10: ICD-10-CM

## 2019-07-02 DIAGNOSIS — L30.4: Primary | ICD-10-CM

## 2020-10-05 ENCOUNTER — HOSPITAL ENCOUNTER (EMERGENCY)
Dept: HOSPITAL 1 - ED | Age: 45
Discharge: HOME | End: 2020-10-05
Payer: MEDICAID

## 2020-10-05 VITALS — DIASTOLIC BLOOD PRESSURE: 88 MMHG | SYSTOLIC BLOOD PRESSURE: 145 MMHG

## 2020-10-05 VITALS
WEIGHT: 155 LBS | WEIGHT: 155 LBS | BODY MASS INDEX: 29.27 KG/M2 | HEIGHT: 61 IN | BODY MASS INDEX: 29.27 KG/M2 | HEIGHT: 61 IN

## 2020-10-05 DIAGNOSIS — J45.909: ICD-10-CM

## 2020-10-05 DIAGNOSIS — R05: Primary | ICD-10-CM

## 2020-10-05 DIAGNOSIS — Z88.1: ICD-10-CM

## 2020-10-05 DIAGNOSIS — I10: ICD-10-CM

## 2020-10-05 DIAGNOSIS — Z20.828: ICD-10-CM

## 2020-11-04 ENCOUNTER — HOSPITAL ENCOUNTER (EMERGENCY)
Dept: HOSPITAL 1 - ED | Age: 45
Discharge: HOME | End: 2020-11-04
Payer: MEDICAID

## 2020-11-04 VITALS
HEIGHT: 61 IN | WEIGHT: 155 LBS | BODY MASS INDEX: 29.27 KG/M2 | BODY MASS INDEX: 29.27 KG/M2 | HEIGHT: 61 IN | WEIGHT: 155 LBS

## 2020-11-04 VITALS — DIASTOLIC BLOOD PRESSURE: 53 MMHG | SYSTOLIC BLOOD PRESSURE: 139 MMHG

## 2020-11-04 DIAGNOSIS — Z88.1: ICD-10-CM

## 2020-11-04 DIAGNOSIS — J45.909: ICD-10-CM

## 2020-11-04 DIAGNOSIS — I10: ICD-10-CM

## 2020-11-04 DIAGNOSIS — R05: Primary | ICD-10-CM

## 2021-01-20 ENCOUNTER — HOSPITAL ENCOUNTER (EMERGENCY)
Dept: HOSPITAL 1 - ED | Age: 46
Discharge: HOME | End: 2021-01-20
Payer: MEDICAID

## 2021-01-20 VITALS — SYSTOLIC BLOOD PRESSURE: 140 MMHG | DIASTOLIC BLOOD PRESSURE: 70 MMHG

## 2021-01-20 VITALS — BODY MASS INDEX: 28.52 KG/M2 | HEIGHT: 62 IN | WEIGHT: 155 LBS

## 2021-01-20 DIAGNOSIS — H60.91: Primary | ICD-10-CM
